# Patient Record
Sex: MALE | Race: BLACK OR AFRICAN AMERICAN | Employment: FULL TIME | URBAN - METROPOLITAN AREA
[De-identification: names, ages, dates, MRNs, and addresses within clinical notes are randomized per-mention and may not be internally consistent; named-entity substitution may affect disease eponyms.]

---

## 2022-03-18 PROBLEM — E11.8 CONTROLLED DIABETES MELLITUS TYPE 2 WITH COMPLICATIONS (HCC): Status: ACTIVE | Noted: 2019-07-26

## 2022-03-18 PROBLEM — Z71.2 ENCOUNTER TO DISCUSS TEST RESULTS: Status: ACTIVE | Noted: 2019-07-26

## 2022-03-18 PROBLEM — Z12.11 SCREENING FOR COLON CANCER: Status: ACTIVE | Noted: 2019-07-20

## 2022-03-18 PROBLEM — N20.0 KIDNEY STONE: Status: ACTIVE | Noted: 2019-02-21

## 2022-03-18 PROBLEM — E66.01 MORBID OBESITY (HCC): Status: ACTIVE | Noted: 2019-02-21

## 2022-03-19 PROBLEM — Z71.82 EXERCISE COUNSELING: Status: ACTIVE | Noted: 2019-07-20

## 2022-03-19 PROBLEM — Z71.3 DIETARY COUNSELING: Status: ACTIVE | Noted: 2019-07-20

## 2022-03-19 PROBLEM — L30.4 INTERTRIGO: Status: ACTIVE | Noted: 2019-07-20

## 2022-03-19 PROBLEM — Z12.5 SCREENING FOR PROSTATE CANCER: Status: ACTIVE | Noted: 2019-07-20

## 2022-03-19 PROBLEM — R73.9 ELEVATED BLOOD SUGAR: Status: ACTIVE | Noted: 2019-07-20

## 2022-03-19 PROBLEM — Z02.4 DRIVER'S PERMIT PE (PHYSICAL EXAMINATION): Status: ACTIVE | Noted: 2019-02-21

## 2022-03-19 PROBLEM — Z02.4 DRIVER'S PERMIT PHYSICAL EXAMINATION: Status: ACTIVE | Noted: 2017-02-10

## 2022-03-20 PROBLEM — E78.1 HYPERTRIGLYCERIDEMIA: Status: ACTIVE | Noted: 2019-07-26

## 2022-03-20 PROBLEM — E66.9 OBESITY, UNSPECIFIED OBESITY SEVERITY, UNSPECIFIED OBESITY TYPE: Status: ACTIVE | Noted: 2019-07-20

## 2022-04-16 PROBLEM — Z71.2 ENCOUNTER TO DISCUSS TEST RESULTS: Status: RESOLVED | Noted: 2019-07-26 | Resolved: 2022-04-16

## 2022-04-16 PROBLEM — Z12.5 SCREENING FOR PROSTATE CANCER: Status: RESOLVED | Noted: 2019-07-20 | Resolved: 2022-04-16

## 2022-04-16 PROBLEM — Z12.11 SCREENING FOR COLON CANCER: Status: RESOLVED | Noted: 2019-07-20 | Resolved: 2022-04-16

## 2022-12-19 ENCOUNTER — APPOINTMENT (OUTPATIENT)
Dept: CT IMAGING | Age: 57
End: 2022-12-19
Payer: COMMERCIAL

## 2022-12-19 ENCOUNTER — APPOINTMENT (OUTPATIENT)
Dept: GENERAL RADIOLOGY | Age: 57
End: 2022-12-19
Payer: COMMERCIAL

## 2022-12-19 ENCOUNTER — HOSPITAL ENCOUNTER (EMERGENCY)
Age: 57
Discharge: HOME | End: 2022-12-20
Attending: EMERGENCY MEDICINE
Payer: COMMERCIAL

## 2022-12-19 VITALS
HEIGHT: 74 IN | BODY MASS INDEX: 40.43 KG/M2 | WEIGHT: 315 LBS | TEMPERATURE: 99.3 F | SYSTOLIC BLOOD PRESSURE: 141 MMHG | HEART RATE: 108 BPM | RESPIRATION RATE: 18 BRPM | OXYGEN SATURATION: 97 % | DIASTOLIC BLOOD PRESSURE: 85 MMHG

## 2022-12-19 DIAGNOSIS — U07.1 COVID-19 VIRUS INFECTION: Primary | ICD-10-CM

## 2022-12-19 DIAGNOSIS — R53.83 OTHER FATIGUE: ICD-10-CM

## 2022-12-19 DIAGNOSIS — R55 SYNCOPE, UNSPECIFIED SYNCOPE TYPE: ICD-10-CM

## 2022-12-19 LAB
ALBUMIN SERPL-MCNC: 3.7 G/DL (ref 3.5–5)
ALBUMIN/GLOB SERPL: 0.9 {RATIO} (ref 0.4–1.6)
ALP SERPL-CCNC: 64 U/L (ref 50–136)
ALT SERPL-CCNC: 37 U/L (ref 12–65)
ANION GAP SERPL CALC-SCNC: 6 MMOL/L (ref 2–11)
AST SERPL-CCNC: 29 U/L (ref 15–37)
BASOPHILS # BLD: 0 K/UL (ref 0–0.2)
BASOPHILS NFR BLD: 1 % (ref 0–2)
BILIRUB SERPL-MCNC: 0.3 MG/DL (ref 0.2–1.1)
BUN SERPL-MCNC: 11 MG/DL (ref 6–23)
CALCIUM SERPL-MCNC: 8.8 MG/DL (ref 8.3–10.4)
CHLORIDE SERPL-SCNC: 104 MMOL/L (ref 101–110)
CO2 SERPL-SCNC: 26 MMOL/L (ref 21–32)
CREAT SERPL-MCNC: 1.5 MG/DL (ref 0.8–1.5)
DIFFERENTIAL METHOD BLD: NORMAL
EKG ATRIAL RATE: 104 BPM
EKG DIAGNOSIS: NORMAL
EKG P AXIS: 67 DEGREES
EKG P-R INTERVAL: 192 MS
EKG Q-T INTERVAL: 348 MS
EKG QRS DURATION: 90 MS
EKG QTC CALCULATION (BAZETT): 457 MS
EKG R AXIS: 74 DEGREES
EKG T AXIS: -11 DEGREES
EKG VENTRICULAR RATE: 104 BPM
EOSINOPHIL # BLD: 0 K/UL (ref 0–0.8)
EOSINOPHIL NFR BLD: 1 % (ref 0.5–7.8)
ERYTHROCYTE [DISTWIDTH] IN BLOOD BY AUTOMATED COUNT: 12.5 % (ref 11.9–14.6)
FLUAV AG NPH QL IA: NEGATIVE
FLUBV AG NPH QL IA: NEGATIVE
GLOBULIN SER CALC-MCNC: 4.3 G/DL (ref 2.8–4.5)
GLUCOSE SERPL-MCNC: 219 MG/DL (ref 65–100)
HCT VFR BLD AUTO: 44.6 % (ref 41.1–50.3)
HGB BLD-MCNC: 15.3 G/DL (ref 13.6–17.2)
IMM GRANULOCYTES # BLD AUTO: 0 K/UL (ref 0–0.5)
IMM GRANULOCYTES NFR BLD AUTO: 0 % (ref 0–5)
LYMPHOCYTES # BLD: 1.6 K/UL (ref 0.5–4.6)
LYMPHOCYTES NFR BLD: 26 % (ref 13–44)
MCH RBC QN AUTO: 30.7 PG (ref 26.1–32.9)
MCHC RBC AUTO-ENTMCNC: 34.3 G/DL (ref 31.4–35)
MCV RBC AUTO: 89.4 FL (ref 82–102)
MONOCYTES # BLD: 0.7 K/UL (ref 0.1–1.3)
MONOCYTES NFR BLD: 12 % (ref 4–12)
NEUTS SEG # BLD: 3.5 K/UL (ref 1.7–8.2)
NEUTS SEG NFR BLD: 60 % (ref 43–78)
NRBC # BLD: 0 K/UL (ref 0–0.2)
PLATELET # BLD AUTO: 167 K/UL (ref 150–450)
PMV BLD AUTO: 10.6 FL (ref 9.4–12.3)
POTASSIUM SERPL-SCNC: 3.9 MMOL/L (ref 3.5–5.1)
PROT SERPL-MCNC: 8 G/DL (ref 6.3–8.2)
RBC # BLD AUTO: 4.99 M/UL (ref 4.23–5.6)
SARS-COV-2 RDRP RESP QL NAA+PROBE: DETECTED
SODIUM SERPL-SCNC: 136 MMOL/L (ref 133–143)
SOURCE: ABNORMAL
SPECIMEN SOURCE: NORMAL
TROPONIN I SERPL HS-MCNC: 7.3 PG/ML (ref 0–14)
WBC # BLD AUTO: 5.9 K/UL (ref 4.3–11.1)

## 2022-12-19 PROCEDURE — 87804 INFLUENZA ASSAY W/OPTIC: CPT

## 2022-12-19 PROCEDURE — 85025 COMPLETE CBC W/AUTO DIFF WBC: CPT

## 2022-12-19 PROCEDURE — 93005 ELECTROCARDIOGRAM TRACING: CPT | Performed by: EMERGENCY MEDICINE

## 2022-12-19 PROCEDURE — 70450 CT HEAD/BRAIN W/O DYE: CPT

## 2022-12-19 PROCEDURE — 80053 COMPREHEN METABOLIC PANEL: CPT

## 2022-12-19 PROCEDURE — 71046 X-RAY EXAM CHEST 2 VIEWS: CPT

## 2022-12-19 PROCEDURE — 84484 ASSAY OF TROPONIN QUANT: CPT

## 2022-12-19 PROCEDURE — 99285 EMERGENCY DEPT VISIT HI MDM: CPT

## 2022-12-19 PROCEDURE — 87635 SARS-COV-2 COVID-19 AMP PRB: CPT

## 2022-12-19 RX ORDER — 0.9 % SODIUM CHLORIDE 0.9 %
1000 INTRAVENOUS SOLUTION INTRAVENOUS ONCE
Status: COMPLETED | OUTPATIENT
Start: 2022-12-19 | End: 2022-12-20

## 2022-12-19 RX ORDER — PHENTERMINE HYDROCHLORIDE 37.5 MG/1
37.5 TABLET ORAL
COMMUNITY

## 2022-12-19 ASSESSMENT — ENCOUNTER SYMPTOMS
DIARRHEA: 0
BACK PAIN: 0
RHINORRHEA: 1
NAUSEA: 0
SORE THROAT: 0
SHORTNESS OF BREATH: 1
WHEEZING: 0
COUGH: 1
ABDOMINAL PAIN: 0
VOMITING: 0
VOICE CHANGE: 0
TROUBLE SWALLOWING: 0
PHOTOPHOBIA: 0

## 2022-12-19 ASSESSMENT — PAIN - FUNCTIONAL ASSESSMENT: PAIN_FUNCTIONAL_ASSESSMENT: 0-10

## 2022-12-19 ASSESSMENT — PAIN SCALES - GENERAL: PAINLEVEL_OUTOF10: 5

## 2022-12-19 NOTE — Clinical Note
Trang Escobar was seen and treated in our emergency department on 12/19/2022. COVID19 virus is suspected. Per the CDC guidelines we recommend home isolation until the following conditions are all met:    1. At least five days have passed since symptoms first appeared and/or had a close exposure,   2. After home isolation for five days, wearing a mask around others for the next five days,  3. At least 24 have passed since last fever without the use of fever-reducing medications and  4. Symptoms (eg cough, shortness of breath) have improved    If you have any questions or concerns, please don't hesitate to call.     He may return to work/school on 12/24/2022        Riki Garcia MD

## 2022-12-19 NOTE — Clinical Note
Juanito Reyna was seen and treated in our emergency department on 12/19/2022. COVID19 virus is suspected. Per the CDC guidelines we recommend home isolation until the following conditions are all met:    1. At least five days have passed since symptoms first appeared and/or had a close exposure,   2. After home isolation for five days, wearing a mask around others for the next five days,  3. At least 24 have passed since last fever without the use of fever-reducing medications and  4. Symptoms (eg cough, shortness of breath) have improved    If you have any questions or concerns, please don't hesitate to call.     He may return to work/school on 12/24/2022        Carmenza Gaming MD

## 2022-12-20 LAB
D DIMER PPP FEU-MCNC: 0.44 UG/ML(FEU)
TROPONIN I SERPL HS-MCNC: 7.7 PG/ML (ref 0–14)

## 2022-12-20 PROCEDURE — 84484 ASSAY OF TROPONIN QUANT: CPT

## 2022-12-20 PROCEDURE — 85379 FIBRIN DEGRADATION QUANT: CPT

## 2022-12-20 PROCEDURE — 6370000000 HC RX 637 (ALT 250 FOR IP): Performed by: EMERGENCY MEDICINE

## 2022-12-20 PROCEDURE — 2580000003 HC RX 258: Performed by: EMERGENCY MEDICINE

## 2022-12-20 RX ORDER — BENZONATATE 100 MG/1
100 CAPSULE ORAL 3 TIMES DAILY PRN
Qty: 21 CAPSULE | Refills: 0 | Status: SHIPPED | OUTPATIENT
Start: 2022-12-20 | End: 2022-12-27

## 2022-12-20 RX ORDER — ACETAMINOPHEN 500 MG
1000 TABLET ORAL
Status: DISCONTINUED | OUTPATIENT
Start: 2022-12-20 | End: 2022-12-20

## 2022-12-20 RX ORDER — ACETAMINOPHEN 160 MG/5ML
650 SUSPENSION, ORAL (FINAL DOSE FORM) ORAL
Status: COMPLETED | OUTPATIENT
Start: 2022-12-20 | End: 2022-12-20

## 2022-12-20 RX ADMIN — SODIUM CHLORIDE 1000 ML: 9 INJECTION, SOLUTION INTRAVENOUS at 01:21

## 2022-12-20 RX ADMIN — ACETAMINOPHEN 650 MG: 325 SUSPENSION ORAL at 03:46

## 2022-12-20 NOTE — ED TRIAGE NOTES
Pt to ED with c/o headache, body aches, nasal congestion, cough, fever, chills, loss of appetite, and fatigue. Pt wife tested positive for covid yesterday. This evening pt had a syncopal episode after having a bowel movement. Pt states sxs started on Saturday. Wife states she covid tested him yesterday x3 that were all negative. Pt alert and in NAD at this time.

## 2022-12-20 NOTE — ED PROVIDER NOTES
Emergency Department Provider Note                   PCP:                None None               Age: 65 y.o. Sex: male       ICD-10-CM    1. COVID-19 virus infection  U07.1       2. Other fatigue  R53.83       3. Syncope, unspecified syncope type  R55           DISPOSITION Discharge - Pending Orders Complete 12/20/2022 12:57:32 AM        MDM  Number of Diagnoses or Management Options  COVID-19 virus infection: new, needed workup  Other fatigue: new, needed workup  Syncope, unspecified syncope type: new, needed workup  Diagnosis management comments: 43-year-old male presents with complaint of fatigue, congestion, cough, generalized malaise and fatigue that is worsened over the past several days. Wife recently tested positive for COVID. Patient evaluated in triage. Discussed concern that patient likely has COVID-19 and had vasovagal syncopal episode after having bowel movement. Patient's wife has her sister on the phone who is reportedly studying to be a medical professional.  States that they are concerned for possibility of pulmonary embolism or stroke. Wife adamant that patient have CT scan of his head as well as additional blood work. Vital signs stable. O2 sat 97% on room air.  ====================================  Orthostatic negative. Patient given 1 L normal saline IV fluid bolus. Basic labs obtained. Ultimately patient noted to be positive for COVID-19. Initial and repeat troponins unremarkable. D-dimer within normal limits. Remainder of labs unremarkable. Chest x-ray clear. CT head with no acute intracranial abnormality. Periventricular hypodensities that are nonspecific and likely due to chronic small vessel changes. Discussed treatment options for patient's underlying diagnosis of COVID-19. Discussed risk/benefits w/ use of Paxolid. Patient requesting prescription of Paxlovid. Will discharge home with Paxil bid, Tessalon Perles. Instructed to follow-up primary care physician. Patient given strict return precautions. Amount and/or Complexity of Data Reviewed  Clinical lab tests: ordered and reviewed  Tests in the radiology section of CPT®: ordered and reviewed  Tests in the medicine section of CPT®: ordered and reviewed  Review and summarize past medical records: yes  Independent visualization of images, tracings, or specimens: yes    Risk of Complications, Morbidity, and/or Mortality  Presenting problems: moderate  Diagnostic procedures: moderate  Management options: moderate    Patient Progress  Patient progress: stable      ED Course as of 12/20/22 0916   Tue Dec 20, 2022   0002 SARS-CoV-2, Rapid(!!): Detected [DF]   0023 2 view Chest X-ray FINDINGS:      There is no focal pulmonary consolidation, pleural effusion or pneumothorax. No  pulmonary edema. Cardiac mediastinal silhouette is within normal limits. There  are degenerative changes of the spine. IMPRESSION:     1. No evidence of acute pulmonary disease. [DF]   0100 Troponin, High Sensitivity: 7.3 [DF]   0106 CT head FINDINGS:     There is no acute intracranial hemorrhage or evidence for acute territorial  infarction. There is no mass effect, midline shift or hydrocephalus. There is no  extra-axial fluid collection. The cerebellum and brainstem are grossly  unremarkable. Periventricular diffuse hypodensities are nonspecific and likely secondary to  chronic small vessel changes. Included globes appear intact. Included paranasal sinuses and the mastoid air cells are aerated. There is no skull fracture. IMPRESSION:     1. No CT evidence of acute intracranial abnormality. 2. Periventricular hypodensities, nonspecific and likely due to chronic small vessel changes. Demyelinating disease is considered less likely.  [DF]   0217 Troponin, High Sensitivity: 7.7 [DF]   0234 D-Dimer, Quant: 0.44 [DF]      ED Course User Index  [DF] Kev Reilly MD        Orders Placed This Encounter disturbance. Respiratory:  Positive for cough and shortness of breath. Negative for wheezing. Cardiovascular:  Negative for chest pain and palpitations. Gastrointestinal:  Negative for abdominal pain, diarrhea, nausea and vomiting. Genitourinary:  Negative for dysuria, flank pain and hematuria. Musculoskeletal:  Positive for myalgias. Negative for arthralgias, back pain, neck pain and neck stiffness. Skin:  Negative for rash and wound. Neurological:  Negative for dizziness, seizures, facial asymmetry, speech difficulty, weakness, numbness and headaches. Hematological:  Does not bruise/bleed easily. Past Medical History:   Diagnosis Date    Controlled diabetes mellitus type 2 with complications ([facility] 9/18/4621    Kidney stone on right side     [facility] 4-6[facility]12    BMI 46.2        Past Surgical History:   Procedure Laterality Date    APPENDECTOMY  1997    CHOLECYSTECTOMY, LAPAROSCOPIC  2005    NEUROLOGICAL SURGERY  2000    lower back surgery         Family History   Problem Relation Age of Onset    Cancer Father 58        lung, smoker    Hypertension Mother         Social History     Socioeconomic History    Marital status:      Spouse name: None    Number of children: None    Years of education: None    Highest education level: None   Tobacco Use    Smoking status: Never    Smokeless tobacco: Former     Quit date: 1/1/2017   Vaping Use    Vaping Use: Never used   Substance and Sexual Activity    Alcohol use: No    Drug use: No     Types: Prescription    Sexual activity: Yes     Partners: Female         Patient has no known allergies. Previous Medications    PHENTERMINE (ADIPEX-P) 37.5 MG TABLET    Take 37.5 mg by mouth every morning (before breakfast). Vitals signs and nursing note reviewed.    Patient Vitals for the past 4 hrs:   Temp Pulse Resp BP SpO2   12/19/22 2245 99.3 °F (37.4 °C) (!) 108 18 (!) 141/85 97 %          Physical Exam  Vitals and nursing note reviewed. Constitutional:       Appearance: Normal appearance. HENT:      Head: Normocephalic and atraumatic. Comments: Atraumatic. Right Ear: Tympanic membrane and ear canal normal.      Left Ear: Tympanic membrane and ear canal normal.      Nose: Nose normal.      Mouth/Throat:      Mouth: Mucous membranes are moist.      Pharynx: No oropharyngeal exudate or posterior oropharyngeal erythema. Eyes:      Extraocular Movements: Extraocular movements intact. Conjunctiva/sclera: Conjunctivae normal.      Pupils: Pupils are equal, round, and reactive to light. Neck:      Comments: FROM. No midline C-spine TTP. No nuchal rigidity. Cardiovascular:      Rate and Rhythm: Tachycardia present. Pulses: Normal pulses. Heart sounds: Normal heart sounds. Pulmonary:      Effort: Pulmonary effort is normal.      Breath sounds: Normal breath sounds. Abdominal:      General: Bowel sounds are normal.      Palpations: Abdomen is soft. Tenderness: There is no abdominal tenderness. There is no guarding or rebound. Musculoskeletal:         General: No deformity. Normal range of motion. Cervical back: Normal range of motion. No rigidity. Comments: No calf TTP. No palpable cords. No lower extremity edema. Skin:     General: Skin is warm. Findings: No rash. Neurological:      General: No focal deficit present. Mental Status: He is alert and oriented to person, place, and time. Cranial Nerves: No cranial nerve deficit. Sensory: No sensory deficit. Motor: No weakness. Comments: Strength 5/5 throughout. No drift. No facial droop. No dysarthria.  Normal sensory exam.    Psychiatric:         Mood and Affect: Mood normal.         Behavior: Behavior normal.        EKG 12 Lead    Date/Time: 12/19/2022 11:13 PM  Performed by: Long Weeks MD  Authorized by: Long Weeks MD     ECG reviewed by ED Physician in the absence of a cardiologist: yes    Rate:     ECG rate:  104    ECG rate assessment: tachycardic    Rhythm:     Rhythm: sinus tachycardia    Ectopy:     Ectopy: none    QRS:     QRS axis:  Normal    QRS intervals:  Normal    QRS conduction: normal    ST segments:     ST segments:  Non-specific  T waves:     T waves: inverted      Inverted:  II, III, aVF, V4, V5 and V6    Results for orders placed or performed during the hospital encounter of 12/19/22   COVID-19, Rapid    Specimen: Nasopharyngeal   Result Value Ref Range    Source NASAL      SARS-CoV-2, Rapid Detected (AA) NOTD     Rapid influenza A/B antigens    Specimen: Nasal Washing   Result Value Ref Range    Influenza A Ag Negative NEG      Influenza B Ag Negative NEG      Source Nasopharyngeal     XR CHEST (2 VW)    Narrative    Frontal and lateral views of the chest     COMPARISON: none    INDICATION: Syncope    FINDINGS:     There is no focal pulmonary consolidation, pleural effusion or pneumothorax. No  pulmonary edema. Cardiac mediastinal silhouette is within normal limits. There  are degenerative changes of the spine. Impression    1. No evidence of acute pulmonary disease. CT HEAD WO CONTRAST    Narrative    Noncontrast CT of the brain. COMPARISON: none    INDICATION: Headache. Altered mental status    TECHNIQUE: Contiguous axial images were obtained from the skull base through the  vertex without IV contrast. Radiation dose reduction techniques were used for  this study:  Our CT scanners use one or all of the following: Automated exposure  control, adjustment of the mA and/or kVp according to patient's size, iterative  reconstruction. FINDINGS:    There is no acute intracranial hemorrhage or evidence for acute territorial  infarction. There is no mass effect, midline shift or hydrocephalus. There is no  extra-axial fluid collection. The cerebellum and brainstem are grossly  unremarkable.     Periventricular diffuse hypodensities are nonspecific and likely secondary to  chronic small vessel changes. Included globes appear intact. Included paranasal sinuses and the mastoid air cells are aerated. There is no skull fracture. Impression    1. No CT evidence of acute intracranial abnormality. 2. Periventricular hypodensities, nonspecific and likely due to chronic small  vessel changes. Demyelinating disease is considered less likely.    CBC with Auto Differential   Result Value Ref Range    WBC 5.9 4.3 - 11.1 K/uL    RBC 4.99 4.23 - 5.6 M/uL    Hemoglobin 15.3 13.6 - 17.2 g/dL    Hematocrit 44.6 41.1 - 50.3 %    MCV 89.4 82 - 102 FL    MCH 30.7 26.1 - 32.9 PG    MCHC 34.3 31.4 - 35.0 g/dL    RDW 12.5 11.9 - 14.6 %    Platelets 072 234 - 226 K/uL    MPV 10.6 9.4 - 12.3 FL    nRBC 0.00 0.0 - 0.2 K/uL    Differential Type AUTOMATED      Seg Neutrophils 60 43 - 78 %    Lymphocytes 26 13 - 44 %    Monocytes 12 4.0 - 12.0 %    Eosinophils % 1 0.5 - 7.8 %    Basophils 1 0.0 - 2.0 %    Immature Granulocytes 0 0.0 - 5.0 %    Segs Absolute 3.5 1.7 - 8.2 K/UL    Absolute Lymph # 1.6 0.5 - 4.6 K/UL    Absolute Mono # 0.7 0.1 - 1.3 K/UL    Absolute Eos # 0.0 0.0 - 0.8 K/UL    Basophils Absolute 0.0 0.0 - 0.2 K/UL    Absolute Immature Granulocyte 0.0 0.0 - 0.5 K/UL   CMP   Result Value Ref Range    Sodium 136 133 - 143 mmol/L    Potassium 3.9 3.5 - 5.1 mmol/L    Chloride 104 101 - 110 mmol/L    CO2 26 21 - 32 mmol/L    Anion Gap 6 2 - 11 mmol/L    Glucose 219 (H) 65 - 100 mg/dL    BUN 11 6 - 23 MG/DL    Creatinine 1.50 0.8 - 1.5 MG/DL    Est, Glom Filt Rate 54 (L) >60 ml/min/1.73m2    Calcium 8.8 8.3 - 10.4 MG/DL    Total Bilirubin 0.3 0.2 - 1.1 MG/DL    ALT 37 12 - 65 U/L    AST 29 15 - 37 U/L    Alk Phosphatase 64 50 - 136 U/L    Total Protein 8.0 6.3 - 8.2 g/dL    Albumin 3.7 3.5 - 5.0 g/dL    Globulin 4.3 2.8 - 4.5 g/dL    Albumin/Globulin Ratio 0.9 0.4 - 1.6     Troponin   Result Value Ref Range    Troponin, High Sensitivity 7.3 0 - 14 pg/mL   EKG 12 Lead Result Value Ref Range    Ventricular Rate 104 BPM    Atrial Rate 104 BPM    P-R Interval 192 ms    QRS Duration 90 ms    Q-T Interval 348 ms    QTc Calculation (Bazett) 457 ms    P Axis 67 degrees    R Axis 74 degrees    T Axis -11 degrees    Diagnosis Sinus tachycardia         CT HEAD WO CONTRAST   Final Result      1. No CT evidence of acute intracranial abnormality. 2. Periventricular hypodensities, nonspecific and likely due to chronic small   vessel changes. Demyelinating disease is considered less likely. XR CHEST (2 VW)   Final Result      1. No evidence of acute pulmonary disease. Voice dictation software was used during the making of this note. This software is not perfect and grammatical and other typographical errors may be present. This note has not been completely proofread for errors.      Héctor Meyer MD  12/20/22 2700

## 2022-12-20 NOTE — DISCHARGE INSTRUCTIONS
Schedule close follow-up primary care physician. Follow CDC/[facility] guidelines in regards to quarantine/self-isolation. Closely monitor pulse oximetry at home. If O2 sats less than 90% return to ER. Take Tylenol/ibuprofen as directed for fever.  ===========================================  CT head findings:     FINDINGS:       There is no acute intracranial hemorrhage or evidence for acute territorial   infarction. There is no mass effect, midline shift or hydrocephalus. There is no   extra-axial fluid collection. The cerebellum and brainstem are grossly   unremarkable. Periventricular diffuse hypodensities are nonspecific and likely secondary to   chronic small vessel changes. Included globes appear intact. Included paranasal sinuses and the mastoid air cells are aerated. There is no skull fracture. Need to schedule outpatient follow-up with neurology, PCP. Will need to have outpatient MRI ordered by primary care physician.

## 2022-12-20 NOTE — ED NOTES
I have reviewed discharge instructions with the patient. The patient verbalized understanding. Patient left ED via Discharge Method: ambulatory to Home with so. Opportunity for questions and clarification provided. Patient given 2 scripts. To continue your aftercare when you leave the hospital, you may receive an automated call from our care team to check in on how you are doing. This is a free service and part of our promise to provide the best care and service to meet your aftercare needs.  If you have questions, or wish to unsubscribe from this service please call [phone]. Thank you for Choosing our [address] [place] [address] Emergency Department.           Moses Mott RN  12/20/22 2876

## 2022-12-21 ENCOUNTER — CARE COORDINATION (OUTPATIENT)
Dept: CARE COORDINATION | Facility: CLINIC | Age: 57
End: 2022-12-21

## 2022-12-21 NOTE — CARE COORDINATION
Patient contacted regarding COVID-19 diagnosis and pulse oximeter ordered at discharge. Discussed COVID-19 related testing which was available at this time. Test results were positive. Patient informed of results, if available? Yes. LPN Care Coordinator contacted the patient by telephone to perform post discharge assessment. Call within 2 business days of discharge: Yes. Verified name and  with patient as identifiers. Provided introduction to self, and explanation of the LPN CC role, and reason for call due to risk factors for infection and/or exposure to COVID-19. Symptoms reviewed with patient who verbalized the following symptoms: fatigue  cough  loss of taste or smell  no new symptoms  no worsening symptoms  Sore throat . Due to no new or worsening symptoms encounter was not routed to provider for escalation. Discussed follow-up appointments. If no appointment was previously scheduled, appointment scheduling offered: Yes. [facility] follow up appointment(s): No future appointments. Non-face-to-face services provided:  Scheduled appointment with PCP-declined PCP referral   Obtained and reviewed discharge summary and/or continuity of care documents  Education of patient/family/caregiver/guardian to support self-management-voiced understanding      Advance Care Planning:   Does patient have an Advance Directive:  patient declined education. Educated patient about risk for severe COVID-19 due to risk factors according to CDC guidelines. LPN CC reviewed discharge instructions, medical action plan and red flag symptoms with the patient who verbalized understanding. Discussed COVID vaccination status: Yes. Education provided on COVID-19 vaccination as appropriate. Discussed exposure protocols and quarantine with CDC Guidelines.  Patient was given an opportunity to verbalize any questions and concerns and agrees to contact LPN CC or health care provider for questions related to their healthcare. Reviewed and educated patient on any new and changed medications related to discharge diagnosis     Was patient discharged with a pulse oximeter? yes, discussed and confirmed pulse oximeter discharge instructions and when to notify provider or seek emergency care. LPN CC provided contact information. Plan for follow-up call in 5-7 days based on severity of symptoms and risk factors.

## 2022-12-28 ENCOUNTER — CARE COORDINATION (OUTPATIENT)
Dept: CARE COORDINATION | Facility: CLINIC | Age: 57
End: 2022-12-28

## 2022-12-28 NOTE — CARE COORDINATION
You Patient resolved from the Care Transitions episode on 12/28/22  Discussed COVID-19 related testing which was available at this time. Test results were positive. Patient informed of results, if available? Yes    Patient/family has been provided the following resources and education related to COVID-19:                         Signs, symptoms and red flags related to COVID-19            CDC exposure and quarantine guidelines            [employer] exposure contact - [phone]            Contact for their local Department of Health                 Patient currently reports that the following symptoms have improved:   loss of taste or smell and sore throat , fatigue, cough, and no new/worsening symptoms     No further outreach scheduled with this LPN CC. Episode of Care resolved. Patient has this LPN CC contact information if future needs arise.